# Patient Record
Sex: FEMALE | NOT HISPANIC OR LATINO
[De-identification: names, ages, dates, MRNs, and addresses within clinical notes are randomized per-mention and may not be internally consistent; named-entity substitution may affect disease eponyms.]

---

## 2019-04-15 ENCOUNTER — APPOINTMENT (OUTPATIENT)
Dept: UROGYNECOLOGY | Facility: CLINIC | Age: 69
End: 2019-04-15
Payer: MEDICARE

## 2019-04-15 VITALS
WEIGHT: 160 LBS | DIASTOLIC BLOOD PRESSURE: 81 MMHG | TEMPERATURE: 98.6 F | HEART RATE: 98 BPM | RESPIRATION RATE: 16 BRPM | SYSTOLIC BLOOD PRESSURE: 117 MMHG | HEIGHT: 66 IN | BODY MASS INDEX: 25.71 KG/M2

## 2019-04-15 DIAGNOSIS — R39.15 URGENCY OF URINATION: ICD-10-CM

## 2019-04-15 LAB
BILIRUB UR QL STRIP: NORMAL
CLARITY UR: CLEAR
COLLECTION METHOD: NORMAL
GLUCOSE UR-MCNC: NORMAL
HCG UR QL: 0.2 EU/DL
HGB UR QL STRIP.AUTO: NORMAL
KETONES UR-MCNC: NORMAL
LEUKOCYTE ESTERASE UR QL STRIP: NORMAL
NITRITE UR QL STRIP: NORMAL
PH UR STRIP: 6
PROT UR STRIP-MCNC: NORMAL
SP GR UR STRIP: 1

## 2019-04-15 PROCEDURE — 99204 OFFICE O/P NEW MOD 45 MIN: CPT | Mod: 25

## 2019-04-15 PROCEDURE — 51701 INSERT BLADDER CATHETER: CPT

## 2019-04-15 NOTE — DISCUSSION/SUMMARY
[FreeTextEntry1] : Sobia presents with stage 2 cystocele and rectocele. Uterus -5 on today's exam. \par 1. Computer generated images were used to demonstrate her prolapse as well as explain treatment options.  We reviewed management options for her prolapse including: observation, pelvic floor exercises, pessary, surgical management. She would like to try a pessary and will RTO for pessary fitting. Written IUGA information on pessaries provided today.\par 2. Incomplete emptying: likely secondary to CARRANZA due to her prolapse. Will remeasure PVR with prolapse reduced\par 3. For her constipation we discussed a bowel regimen. She reports symptoms improved when she takes Miralax. \par \par \par

## 2019-04-15 NOTE — PROCEDURE
[FreeTextEntry1] : Sterile straight catheterization was performed to measure a postvoid residual volume which was 90 cc

## 2019-04-15 NOTE — CHIEF COMPLAINT
[Questionnaire Received] : Patient questionnaire received [Falling Pelvic Organs] : falling pelvic organs

## 2019-04-15 NOTE — HISTORY OF PRESENT ILLNESS
[Uterine Prolapse] : none [Vaginal Wall Prolapse] : none [Rectal Prolapse] : none [Urinary Frequency] : none [Urinary Frequency More Than Twice At Night (Nocturia)] : none [Hematuria] : none [Feelings Of Urinary Urgency] : none [Pain During Urination (Dysuria)] : none [Unable To Restrain Bowel Movement] : none [Urinary Tract Infection] : frequent [] : years ago [Constipation Obstructed Defecation] : frequent [Stool Visible Blood] : none [FreeTextEntry5] : symptoms worsened over past year [FreeTextEntry6] : BMs every 2 days when takes Miralax [de-identified] : if waits too long [de-identified] : no urgency incontinence [FreeTextEntry1] : \par Sobia presents for consultation for prolapse. She was last seen in 2014 and found to have uterovaginal prolapse. She reports symptoms have worsened over past year. \par \par PSH: 2 c-sections, lumpectomy (DCIS or LCIS unsure s/p tamoxifen and radiation)\par PMH: DCIS or LCIS 1999, asthma, HTN, thyroid disorder, constipation

## 2019-04-15 NOTE — PHYSICAL EXAM
[Oriented x3] : oriented to person, place, and time [No Acute Distress] : in no acute distress [Normal Memory] : ~T memory was ~L unimpaired [Normal Mood/Affect] : mood and affect are normal [Warm and Dry] : was warm and dry to touch [Normal Gait] : gait was normal [Vulvar Atrophy] : vulvar atrophy [Labia Minora] : were normal [Labia Majora] : were normal [Normal Appearance] : general appearance was normal [Dry Mucosa] : dry mucosa [Atrophy] : atrophy [PB ____] : PB [unfilled] [GH ____] : GH [unfilled] [TVL ____] : TVL  [unfilled] [D ____] : D [unfilled] [Normal] : no abnormalities [Aa ____] : Aa [unfilled] [Ba ____] : Ba [unfilled] [C ____] : C [unfilled] [Ap ____] : Ap [unfilled] [Bp ____] : Bp [unfilled] [Anxiety] : patient is not anxious [Tenderness] : ~T no ~M abdominal tenderness observed [Distended] : not distended [H/Smegaly] : no hepatosplenomegaly [Inguinal LAD] : no adenopathy was noted in the inguinal lymph nodes [de-identified] : Uroflow: normal stream

## 2019-04-16 ENCOUNTER — RESULT REVIEW (OUTPATIENT)
Age: 69
End: 2019-04-16

## 2019-04-16 ENCOUNTER — RECORD ABSTRACTING (OUTPATIENT)
Age: 69
End: 2019-04-16

## 2019-04-16 DIAGNOSIS — Z82.3 FAMILY HISTORY OF STROKE: ICD-10-CM

## 2019-04-16 DIAGNOSIS — Z87.09 PERSONAL HISTORY OF OTHER DISEASES OF THE RESPIRATORY SYSTEM: ICD-10-CM

## 2019-04-16 DIAGNOSIS — Z92.3 PERSONAL HISTORY OF IRRADIATION: ICD-10-CM

## 2019-04-16 LAB
APPEARANCE: CLEAR
BACTERIA: NEGATIVE
BILIRUBIN URINE: NEGATIVE
BLOOD URINE: NEGATIVE
COLOR: COLORLESS
GLUCOSE QUALITATIVE U: NEGATIVE
HYALINE CASTS: 0 /LPF
KETONES URINE: NEGATIVE
LEUKOCYTE ESTERASE URINE: NEGATIVE
MICROSCOPIC-UA: NORMAL
NITRITE URINE: NEGATIVE
PH URINE: 6.5
PROTEIN URINE: NEGATIVE
RED BLOOD CELLS URINE: 1 /HPF
SPECIFIC GRAVITY URINE: 1.01
SQUAMOUS EPITHELIAL CELLS: 0 /HPF
UROBILINOGEN URINE: NORMAL
WHITE BLOOD CELLS URINE: 0 /HPF

## 2019-04-17 ENCOUNTER — RESULT REVIEW (OUTPATIENT)
Age: 69
End: 2019-04-17

## 2019-04-17 LAB — BACTERIA UR CULT: NORMAL

## 2019-05-07 ENCOUNTER — APPOINTMENT (OUTPATIENT)
Dept: UROGYNECOLOGY | Facility: CLINIC | Age: 69
End: 2019-05-07
Payer: MEDICARE

## 2019-05-07 ENCOUNTER — OUTPATIENT (OUTPATIENT)
Dept: OUTPATIENT SERVICES | Facility: HOSPITAL | Age: 69
LOS: 1 days | End: 2019-05-07

## 2019-05-07 DIAGNOSIS — R33.9 RETENTION OF URINE, UNSPECIFIED: ICD-10-CM

## 2019-05-07 PROCEDURE — A4562: CPT

## 2019-05-07 PROCEDURE — 51798 US URINE CAPACITY MEASURE: CPT

## 2019-05-07 PROCEDURE — 99214 OFFICE O/P EST MOD 30 MIN: CPT | Mod: 25

## 2019-05-07 NOTE — PROCEDURE
[Good Fit] : fits well [Incomplete Emptying Of Bladder] : incomplete emptying of bladder [Pessary Inserted] : inserted [None] : no bleeding [Bladder Scan Completed] : a pelvic/bladder ultrasound was performed to determine the residual volume. [Erythema] : no erythema [Erosion] : no evidence of erosion [Discharge] : no vaginal discharge [Infection] : no evidence of infection [Residual Volume ___] : the final residual volume was [unfilled] cc [FreeTextEntry1] : RS #5

## 2019-05-07 NOTE — HISTORY OF PRESENT ILLNESS
[Uterine Prolapse] : none [Vaginal Wall Prolapse] : none [Rectal Prolapse] : none [Unable To Restrain Bowel Movement] : none [Urinary Frequency] : none [Hematuria] : none [Urinary Frequency More Than Twice At Night (Nocturia)] : none [Feelings Of Urinary Urgency] : none [Pain During Urination (Dysuria)] : none [Urinary Tract Infection] : frequent [] : years ago [Stool Visible Blood] : none [FreeTextEntry5] : symptoms worsened over past year [de-identified] : if waits too long [de-identified] : no urgency incontinence [FreeTextEntry1] : \par Sobia presents for follow up of prolapse. We reviewed treatment options, she desires pessary fitting. She is sexually active. \par \par PSH: 2 c-sections, lumpectomy (DCIS or LCIS unsure s/p tamoxifen and radiation)\par PMH: DCIS or LCIS 1999, asthma, HTN, thyroid disorder, constipation

## 2019-05-07 NOTE — DISCUSSION/SUMMARY
[FreeTextEntry1] : 1. Prolapse: Fitted today with RS#5. Pessary precautions and instructions reviewed RTO in 1-2 weeks for pessary check and teaching\par 2. Incomplete emptying: likely secondary to CARRANZA due to her prolapse. PVR today normal\par \par \par

## 2019-05-07 NOTE — PHYSICAL EXAM
[No Acute Distress] : in no acute distress [Oriented x3] : oriented to person, place, and time [Normal Mood/Affect] : mood and affect are normal [Normal Memory] : ~T memory was ~L unimpaired [Warm and Dry] : was warm and dry to touch [Normal Gait] : gait was normal [Vulvar Atrophy] : vulvar atrophy [Labia Majora] : were normal [Labia Minora] : were normal [Atrophy] : atrophy [Normal Appearance] : general appearance was normal [Dry Mucosa] : dry mucosa [Aa ____] : Aa [unfilled] [Ba ____] : Ba [unfilled] [C ____] : C [unfilled] [GH ____] : GH [unfilled] [PB ____] : PB [unfilled] [Ap ____] : Ap [unfilled] [TVL ____] : TVL  [unfilled] [Bp ____] : Bp [unfilled] [D ____] : D [unfilled] [Normal] : no abnormalities [Anxiety] : patient is not anxious [Distended] : not distended [Tenderness] : ~T no ~M abdominal tenderness observed [Inguinal LAD] : no adenopathy was noted in the inguinal lymph nodes

## 2019-05-28 ENCOUNTER — APPOINTMENT (OUTPATIENT)
Dept: UROGYNECOLOGY | Facility: CLINIC | Age: 69
End: 2019-05-28
Payer: MEDICARE

## 2019-05-28 DIAGNOSIS — Z87.448 PERSONAL HISTORY OF OTHER DISEASES OF URINARY SYSTEM: ICD-10-CM

## 2019-05-28 DIAGNOSIS — N81.2 INCOMPLETE UTEROVAGINAL PROLAPSE: ICD-10-CM

## 2019-05-28 PROCEDURE — 99214 OFFICE O/P EST MOD 30 MIN: CPT

## 2019-05-28 NOTE — HISTORY OF PRESENT ILLNESS
[FreeTextEntry1] : Pt present to office for f/u of prolapse.  Fitted with RS pessary 2 weeks ago. Happy with fit and support of pessary. she would like to learn self-care today.

## 2019-05-28 NOTE — PHYSICAL EXAM
[No Acute Distress] : in no acute distress [Well developed] : well developed [Well Nourished] : ~L well nourished [Good Hygeine] : demonstrates good hygeine [Normal Memory] : ~T memory was ~L unimpaired [Oriented x3] : oriented to person, place, and time [Normal Mood/Affect] : mood and affect are normal [Warm and Dry] : was warm and dry to touch [Labia Majora] : were normal [Normal Gait] : gait was normal [Labia Minora] : were normal [Normal] : was normal [Normal Appearance] : general appearance was normal [No Bleeding] : there was no active vaginal bleeding [Tenderness] : ~T no ~M abdominal tenderness observed [Anxiety] : patient is not anxious [Distended] : not distended

## 2019-05-28 NOTE — PROCEDURE
[Good Fit] : fits well [Pessary Washed] : washed [None] : no bleeding [H2O] : H2O [Refit] : refit is not needed [Discharge] : no vaginal discharge [Erythema] : no erythema [Erosion] : no evidence of erosion [Infection] : no evidence of infection [FreeTextEntry1] : RS #5 [FreeTextEntry8] : pt able to remove  and insert pessary on her own. Floss attached to pessary to help pt remove pessary. she will perform  self-care 1-2 times per week.

## 2019-05-28 NOTE — DISCUSSION/SUMMARY
[FreeTextEntry1] : Pt is happy with pessary. She will RTO in 3 months for f/u of prolapse. She agrees to call office with any problems/concerns.

## 2019-09-10 ENCOUNTER — APPOINTMENT (OUTPATIENT)
Dept: UROGYNECOLOGY | Facility: CLINIC | Age: 69
End: 2019-09-10
Payer: MEDICARE

## 2019-09-10 DIAGNOSIS — N81.11 CYSTOCELE, MIDLINE: ICD-10-CM

## 2019-09-10 DIAGNOSIS — N81.6 RECTOCELE: ICD-10-CM

## 2019-09-10 PROCEDURE — 99214 OFFICE O/P EST MOD 30 MIN: CPT

## 2019-09-10 NOTE — DISCUSSION/SUMMARY
[FreeTextEntry1] : \par 1. Cystocele, rectocele:\par -Pessary fits well, continue with self-care 1-2 times weekly. Recommend she remove the pessary at night, reinsert in AM. \par -Pessary precautions and instructions reviewed \par -RTO in 6-12 mo for follow up, or sooner if issues arise.

## 2019-09-10 NOTE — PHYSICAL EXAM
[No Acute Distress] : in no acute distress [Well developed] : well developed [Well Nourished] : ~L well nourished [Good Hygeine] : demonstrates good hygeine [Oriented x3] : oriented to person, place, and time [Normal Memory] : ~T memory was ~L unimpaired [Normal Mood/Affect] : mood and affect are normal [Warm and Dry] : was warm and dry to touch [Turgor Normal] : skin turgor ~T was normal [Normal Gait] : gait was normal [No Lesions] : no lesions were seen on the external genitalia [Labia Minora] : were normal [Labia Majora] : were normal [Normal Appearance] : general appearance was normal [Atrophy] : atrophy [Uterine Adnexae] : were not tender and not enlarged [Normal Position] : in a normal position [Exam Deferred] : was deferred [No Bleeding] : there was no active vaginal bleeding [Normal] : no abnormalities [Tenderness] : ~T no ~M abdominal tenderness observed [Distended] : not distended [Inguinal LAD] : no adenopathy was noted in the inguinal lymph nodes

## 2019-09-10 NOTE — HISTORY OF PRESENT ILLNESS
[Vaginal Wall Prolapse] : none [Rectal Prolapse] : none [Pain During Urination (Dysuria)] : none [Feelings Of Urinary Urgency] : none [Urinary Tract Infection] : none [Constipation Obstructed Defecation] : none [] : none [Rectal Pain] : none [FreeTextEntry1] : 69 yo F presenting to office for f/u of stage 2 cystocele and rectocele managed with pessary, RS#5.  Last visit 5/28/19, patient was taught self care at that visit. Today she reports removing the pessary once weekly without difficulty. She is very satisfied with her treatment results.

## 2019-09-10 NOTE — PROCEDURE
[Good Fit] : fits well [Pessary Inserted] : inserted [Pessary Washed] : washed [None] : no bleeding [Refit] : refit is not needed [Erosion] : no evidence of erosion [Erythema] : no erythema [Discharge] : no vaginal discharge [Infection] : no evidence of infection [FreeTextEntry1] : RS#5

## 2019-10-01 DIAGNOSIS — Z01.818 ENCOUNTER FOR OTHER PREPROCEDURAL EXAMINATION: ICD-10-CM

## 2021-05-11 ENCOUNTER — OUTPATIENT (OUTPATIENT)
Dept: INPATIENT UNIT | Facility: HOSPITAL | Age: 71
LOS: 1 days | Discharge: HOME | End: 2021-05-11

## 2021-05-11 ENCOUNTER — APPOINTMENT (OUTPATIENT)
Dept: DISASTER EMERGENCY | Facility: HOSPITAL | Age: 71
End: 2021-05-11

## 2021-05-11 ENCOUNTER — APPOINTMENT (OUTPATIENT)
Dept: DISASTER EMERGENCY | Facility: CLINIC | Age: 71
End: 2021-05-11

## 2021-05-11 VITALS
OXYGEN SATURATION: 93 % | HEART RATE: 102 BPM | DIASTOLIC BLOOD PRESSURE: 71 MMHG | RESPIRATION RATE: 18 BRPM | SYSTOLIC BLOOD PRESSURE: 108 MMHG | TEMPERATURE: 100 F

## 2021-05-11 VITALS
RESPIRATION RATE: 18 BRPM | SYSTOLIC BLOOD PRESSURE: 101 MMHG | HEART RATE: 89 BPM | OXYGEN SATURATION: 94 % | DIASTOLIC BLOOD PRESSURE: 68 MMHG | TEMPERATURE: 99 F

## 2021-05-11 DIAGNOSIS — U07.1 COVID-19: ICD-10-CM

## 2021-05-11 RX ORDER — SODIUM CHLORIDE 9 MG/ML
250 INJECTION INTRAMUSCULAR; INTRAVENOUS; SUBCUTANEOUS
Refills: 0 | Status: DISCONTINUED | OUTPATIENT
Start: 2021-05-11 | End: 2021-05-11

## 2021-05-11 RX ADMIN — SODIUM CHLORIDE 25 MILLILITER(S): 9 INJECTION INTRAMUSCULAR; INTRAVENOUS; SUBCUTANEOUS at 13:50

## 2021-05-11 NOTE — CHART NOTE - NSCHARTNOTEFT_GEN_A_CORE
Medicine Progress Note    Patient is a 70y old  Female who presents with a chief complaint of     SUBJECTIVE / OVERNIGHT EVENTS:    ADDITIONAL REVIEW OF SYSTEMS:    MEDICATIONS  (STANDING):  casirivimab/imdevimab in sodium chloride 0.9% (EUA) IVPB 250 milliLiter(s) IV Intermittent once  sodium chloride 0.9%. 250 milliLiter(s) (25 mL/Hr) IV Continuous <Continuous>    MEDICATIONS  (PRN):    CAPILLARY BLOOD GLUCOSE        I&O's Summary      PHYSICAL EXAM:  Vital Signs Last 24 Hrs  T(C): 37.4 (11 May 2021 13:20), Max: 38.6 (11 May 2021 11:35)  T(F): 99.3 (11 May 2021 13:20), Max: 101.5 (11 May 2021 11:35)  HR: 89 (11 May 2021 13:20) (88 - 102)  BP: 101/68 (11 May 2021 13:20) (101/68 - 121/75)  BP(mean): --  RR: 18 (11 May 2021 13:20) (18 - 18)  SpO2: 94% (11 May 2021 13:20) (93% - 96%)    CONSTITUTIONAL: NAD, well-developed, well-groomed  ENMT: Moist oral mucosa, no pharyngeal injection or exudates; normal dentition  RESPIRATORY: Normal respiratory effort; lungs are clear to auscultation bilaterally  CARDIOVASCULAR: Regular rate and rhythm, normal S1 and S2, no murmur/rub/gallop; No lower extremity edema; Peripheral pulses are 2+ bilaterally  ABDOMEN: Nontender to palpation, normoactive bowel sounds, no rebound/guarding; No hepatosplenomegaly  PSYCH: A+O to person, place, and time; affect appropriate  NEUROLOGY: CN 2-12 are intact and symmetric; no gross sensory deficits   SKIN: No rashes; no palpable lesions    Patient COVID + with risk factors. s/p AB Antibody infusion  -if worsening of condiditon present to ED

## 2021-05-13 ENCOUNTER — APPOINTMENT (OUTPATIENT)
Dept: DISASTER EMERGENCY | Facility: HOSPITAL | Age: 71
End: 2021-05-13

## 2021-08-13 ENCOUNTER — APPOINTMENT (OUTPATIENT)
Dept: DISASTER EMERGENCY | Facility: OTHER | Age: 71
End: 2021-08-13
Payer: MEDICARE

## 2021-08-13 PROCEDURE — 0012A: CPT

## 2023-10-01 PROBLEM — Z92.3 HISTORY OF RADIATION THERAPY: Status: RESOLVED | Noted: 2019-04-16 | Resolved: 2023-10-01
